# Patient Record
Sex: MALE | Race: WHITE | NOT HISPANIC OR LATINO
[De-identification: names, ages, dates, MRNs, and addresses within clinical notes are randomized per-mention and may not be internally consistent; named-entity substitution may affect disease eponyms.]

---

## 2019-03-15 ENCOUNTER — RX RENEWAL (OUTPATIENT)
Age: 69
End: 2019-03-15

## 2019-03-15 ENCOUNTER — OTHER (OUTPATIENT)
Age: 69
End: 2019-03-15

## 2019-03-18 ENCOUNTER — MEDICATION RENEWAL (OUTPATIENT)
Age: 69
End: 2019-03-18

## 2019-03-28 ENCOUNTER — RECORD ABSTRACTING (OUTPATIENT)
Age: 69
End: 2019-03-28

## 2019-03-28 DIAGNOSIS — Z80.42 FAMILY HISTORY OF MALIGNANT NEOPLASM OF PROSTATE: ICD-10-CM

## 2019-03-28 DIAGNOSIS — Z80.3 FAMILY HISTORY OF MALIGNANT NEOPLASM OF BREAST: ICD-10-CM

## 2019-03-28 DIAGNOSIS — Z87.09 PERSONAL HISTORY OF OTHER DISEASES OF THE RESPIRATORY SYSTEM: ICD-10-CM

## 2019-03-28 DIAGNOSIS — J06.9 ACUTE UPPER RESPIRATORY INFECTION, UNSPECIFIED: ICD-10-CM

## 2019-03-28 DIAGNOSIS — Z80.0 FAMILY HISTORY OF MALIGNANT NEOPLASM OF DIGESTIVE ORGANS: ICD-10-CM

## 2019-04-02 ENCOUNTER — APPOINTMENT (OUTPATIENT)
Dept: INTERNAL MEDICINE | Facility: CLINIC | Age: 69
End: 2019-04-02
Payer: COMMERCIAL

## 2019-04-02 VITALS — HEIGHT: 70 IN | BODY MASS INDEX: 27.2 KG/M2 | WEIGHT: 190 LBS

## 2019-04-02 VITALS — DIASTOLIC BLOOD PRESSURE: 76 MMHG | SYSTOLIC BLOOD PRESSURE: 120 MMHG

## 2019-04-02 DIAGNOSIS — R43.1 PAROSMIA: ICD-10-CM

## 2019-04-02 PROCEDURE — 99213 OFFICE O/P EST LOW 20 MIN: CPT

## 2019-04-02 RX ORDER — DOXYCYCLINE 100 MG/1
100 TABLET, FILM COATED ORAL
Refills: 0 | Status: COMPLETED | COMMUNITY
End: 2019-04-02

## 2019-04-02 RX ORDER — ATORVASTATIN CALCIUM 20 MG/1
20 TABLET, FILM COATED ORAL
Qty: 90 | Refills: 3 | Status: COMPLETED | COMMUNITY
End: 2019-04-02

## 2019-04-02 NOTE — PHYSICAL EXAM
[No Acute Distress] : no acute distress [Well Nourished] : well nourished [Well Developed] : well developed [Well-Appearing] : well-appearing [Normal Sclera/Conjunctiva] : normal sclera/conjunctiva [Normal Outer Ear/Nose] : the outer ears and nose were normal in appearance [Normal Oropharynx] : the oropharynx was normal [Normal TMs] : both tympanic membranes were normal [No JVD] : no jugular venous distention [Supple] : supple [No Lymphadenopathy] : no lymphadenopathy [Thyroid Normal, No Nodules] : the thyroid was normal and there were no nodules present [No Respiratory Distress] : no respiratory distress  [Clear to Auscultation] : lungs were clear to auscultation bilaterally [No Accessory Muscle Use] : no accessory muscle use [Normal Rate] : normal rate  [Regular Rhythm] : with a regular rhythm [Normal S1, S2] : normal S1 and S2 [No Joint Swelling] : no joint swelling [Grossly Normal Strength/Tone] : grossly normal strength/tone [No Rash] : no rash [Normal Gait] : normal gait [Coordination Grossly Intact] : coordination grossly intact [No Focal Deficits] : no focal deficits [Deep Tendon Reflexes (DTR)] : deep tendon reflexes were 2+ and symmetric [Normal Affect] : the affect was normal [Normal Insight/Judgement] : insight and judgment were intact

## 2019-04-02 NOTE — REVIEW OF SYSTEMS
[Dysuria] : no dysuria [Incontinence] : no incontinence [Hesitancy] : no hesitancy [Nocturia] : nocturia [Hematuria] : no hematuria [Frequency] : frequency [Impotence] : no impotency [Poor Libido] : libido not poor [Negative] : Heme/Lymph

## 2019-04-02 NOTE — HISTORY OF PRESENT ILLNESS
[FreeTextEntry1] : abnormal smell [de-identified] : over last month has periods of abnormal smell\par occurs frequently lasts a whole days when it comes\par not sick with runny nose\par not using statin\par had sinus infection 11 2018\par past bladder cancer, sees urologist, he wants pt to have scraping of prostate\par nocturia x 5

## 2019-08-12 ENCOUNTER — OTHER (OUTPATIENT)
Age: 69
End: 2019-08-12

## 2019-08-13 ENCOUNTER — APPOINTMENT (OUTPATIENT)
Dept: INTERNAL MEDICINE | Facility: CLINIC | Age: 69
End: 2019-08-13
Payer: COMMERCIAL

## 2019-08-13 PROCEDURE — 36415 COLL VENOUS BLD VENIPUNCTURE: CPT

## 2019-08-14 LAB
ALBUMIN SERPL ELPH-MCNC: 4.2 G/DL
ALP BLD-CCNC: 83 U/L
ALT SERPL-CCNC: 26 U/L
ANION GAP SERPL CALC-SCNC: 13 MMOL/L
AST SERPL-CCNC: 18 U/L
BASOPHILS # BLD AUTO: 0.04 K/UL
BASOPHILS NFR BLD AUTO: 0.8 %
BILIRUB SERPL-MCNC: 0.5 MG/DL
BUN SERPL-MCNC: 26 MG/DL
CALCIUM SERPL-MCNC: 9.1 MG/DL
CHLORIDE SERPL-SCNC: 105 MMOL/L
CO2 SERPL-SCNC: 25 MMOL/L
CREAT SERPL-MCNC: 1.37 MG/DL
EOSINOPHIL # BLD AUTO: 0.1 K/UL
EOSINOPHIL NFR BLD AUTO: 2 %
ERYTHROCYTE [SEDIMENTATION RATE] IN BLOOD BY WESTERGREN METHOD: 10 MM/HR
GLUCOSE SERPL-MCNC: 94 MG/DL
HCT VFR BLD CALC: 48.3 %
HGB BLD-MCNC: 15.8 G/DL
IMM GRANULOCYTES NFR BLD AUTO: 0.2 %
LYMPHOCYTES # BLD AUTO: 1.94 K/UL
LYMPHOCYTES NFR BLD AUTO: 39.2 %
MAN DIFF?: NORMAL
MCHC RBC-ENTMCNC: 29.9 PG
MCHC RBC-ENTMCNC: 32.7 GM/DL
MCV RBC AUTO: 91.3 FL
MONOCYTES # BLD AUTO: 0.4 K/UL
MONOCYTES NFR BLD AUTO: 8.1 %
NEUTROPHILS # BLD AUTO: 2.46 K/UL
NEUTROPHILS NFR BLD AUTO: 49.7 %
PLATELET # BLD AUTO: 224 K/UL
POTASSIUM SERPL-SCNC: 4.6 MMOL/L
PROT SERPL-MCNC: 6.5 G/DL
RBC # BLD: 5.29 M/UL
RBC # FLD: 12.4 %
SODIUM SERPL-SCNC: 143 MMOL/L
URATE SERPL-MCNC: 8.9 MG/DL
WBC # FLD AUTO: 4.95 K/UL

## 2019-09-05 ENCOUNTER — NON-APPOINTMENT (OUTPATIENT)
Age: 69
End: 2019-09-05

## 2019-09-05 ENCOUNTER — APPOINTMENT (OUTPATIENT)
Dept: INTERNAL MEDICINE | Facility: CLINIC | Age: 69
End: 2019-09-05
Payer: COMMERCIAL

## 2019-09-05 VITALS
SYSTOLIC BLOOD PRESSURE: 125 MMHG | BODY MASS INDEX: 27.2 KG/M2 | DIASTOLIC BLOOD PRESSURE: 76 MMHG | WEIGHT: 190 LBS | HEIGHT: 70 IN

## 2019-09-05 DIAGNOSIS — M79.89 OTHER SPECIFIED SOFT TISSUE DISORDERS: ICD-10-CM

## 2019-09-05 LAB
BILIRUB UR QL STRIP: NEGATIVE
CLARITY UR: CLEAR
COLLECTION METHOD: NORMAL
GLUCOSE UR-MCNC: NEGATIVE
HCG UR QL: NORMAL EU/DL
HGB UR QL STRIP.AUTO: NORMAL
KETONES UR-MCNC: NEGATIVE
LEUKOCYTE ESTERASE UR QL STRIP: NEGATIVE
NITRITE UR QL STRIP: NEGATIVE
PH UR STRIP: 5.5
PROT UR STRIP-MCNC: NORMAL
SP GR UR STRIP: 1.02

## 2019-09-05 PROCEDURE — G0442 ANNUAL ALCOHOL SCREEN 15 MIN: CPT

## 2019-09-05 PROCEDURE — 81003 URINALYSIS AUTO W/O SCOPE: CPT | Mod: QW

## 2019-09-05 PROCEDURE — 99397 PER PM REEVAL EST PAT 65+ YR: CPT | Mod: 25

## 2019-09-05 PROCEDURE — 93000 ELECTROCARDIOGRAM COMPLETE: CPT

## 2019-09-05 NOTE — PHYSICAL EXAM
[No Acute Distress] : no acute distress [Well Nourished] : well nourished [Well Developed] : well developed [Normal Sclera/Conjunctiva] : normal sclera/conjunctiva [Well-Appearing] : well-appearing [Normal Outer Ear/Nose] : the outer ears and nose were normal in appearance [Normal Oropharynx] : the oropharynx was normal [No JVD] : no jugular venous distention [No Lymphadenopathy] : no lymphadenopathy [Supple] : supple [Thyroid Normal, No Nodules] : the thyroid was normal and there were no nodules present [No Respiratory Distress] : no respiratory distress  [Clear to Auscultation] : lungs were clear to auscultation bilaterally [No Accessory Muscle Use] : no accessory muscle use [Normal Rate] : normal rate  [Regular Rhythm] : with a regular rhythm [Normal S1, S2] : normal S1 and S2 [No Murmur] : no murmur heard [No Carotid Bruits] : no carotid bruits [No Varicosities] : no varicosities [No Abdominal Bruit] : a ~M bruit was not heard ~T in the abdomen [Pedal Pulses Present] : the pedal pulses are present [No Edema] : there was no peripheral edema [No Extremity Clubbing/Cyanosis] : no extremity clubbing/cyanosis [No Palpable Aorta] : no palpable aorta [Normal Appearance] : normal in appearance [Soft] : abdomen soft [Non Tender] : non-tender [Non-distended] : non-distended [No Masses] : no abdominal mass palpated [Normal Bowel Sounds] : normal bowel sounds [No HSM] : no HSM [Normal Sphincter Tone] : normal sphincter tone [No Mass] : no mass [Normal Posterior Cervical Nodes] : no posterior cervical lymphadenopathy [Normal Anterior Cervical Nodes] : no anterior cervical lymphadenopathy [No CVA Tenderness] : no CVA  tenderness [No Spinal Tenderness] : no spinal tenderness [No Joint Swelling] : no joint swelling [Grossly Normal Strength/Tone] : grossly normal strength/tone [No Rash] : no rash [Coordination Grossly Intact] : coordination grossly intact [No Focal Deficits] : no focal deficits [Normal Gait] : normal gait [Deep Tendon Reflexes (DTR)] : deep tendon reflexes were 2+ and symmetric [Normal Affect] : the affect was normal [Normal Insight/Judgement] : insight and judgment were intact [Stool Occult Blood] : stool negative for occult blood

## 2019-09-05 NOTE — ASSESSMENT
[FreeTextEntry1] : Doing well on current regimen\par Right bundle branch block\par Closely followed by urologist the past bladder cancer and obstructive BPH\par We'll check uric acid with other bloods as patient will return fasting\par Has appointment scheduled next month with the urologist\par On time with gastroenterologist for screening colonoscopies

## 2019-09-05 NOTE — HEALTH RISK ASSESSMENT
[Yes] : Yes [Monthly or less (1 pt)] : Monthly or less (1 point) [Never (0 pts)] : Never (0 points) [1 or 2 (0 pts)] : 1 or 2 (0 points) [0] : 2) Feeling down, depressed, or hopeless: Not at all (0) [Patient reported colonoscopy was normal] : Patient reported colonoscopy was normal [] : No [Audit-CScore] : 1 [[id number]] : 0 [ColonoscopyDate] : 2017 [ColonoscopyComments] : elsewhere

## 2019-09-05 NOTE — HISTORY OF PRESENT ILLNESS
[FreeTextEntry1] : here for complete exam [de-identified] : Patient has hypertension well controlled on medications\par BPH with some obstruction followed closely by urologist may need scraping of the prostate in the near future\par Past bladder cancer gets yearly cystoscopy by urologist\par On time with colonoscopies with Dr. Tobar\par One episode of gout probably diet related with no recurrences since he has started following a gout diet\par Hyperlipidemia on statin in the past\par \par \par

## 2019-09-05 NOTE — REVIEW OF SYSTEMS
[Nocturia] : nocturia [Frequency] : frequency [Negative] : Heme/Lymph [Dysuria] : no dysuria [Incontinence] : no incontinence [Hesitancy] : no hesitancy [Hematuria] : no hematuria [Impotence] : no impotency [Poor Libido] : libido not poor

## 2019-09-06 LAB
APPEARANCE: CLEAR
BACTERIA: NEGATIVE
BILIRUBIN URINE: NEGATIVE
BLOOD URINE: ABNORMAL
COLOR: YELLOW
GLUCOSE QUALITATIVE U: NEGATIVE
HYALINE CASTS: 1 /LPF
KETONES URINE: NEGATIVE
LEUKOCYTE ESTERASE URINE: NEGATIVE
MICROSCOPIC-UA: NORMAL
NITRITE URINE: NEGATIVE
PH URINE: 6
PROTEIN URINE: ABNORMAL
RED BLOOD CELLS URINE: 3 /HPF
SPECIFIC GRAVITY URINE: 1.02
SQUAMOUS EPITHELIAL CELLS: 1 /HPF
UROBILINOGEN URINE: NORMAL
WHITE BLOOD CELLS URINE: 1 /HPF

## 2019-09-07 PROBLEM — N40.0 BENIGN PROSTATIC HYPERPLASIA, UNSPECIFIED WHETHER LOWER URINARY TRACT SYMPTOMS PRESENT: Status: ACTIVE | Noted: 2019-09-07

## 2019-09-09 ENCOUNTER — APPOINTMENT (OUTPATIENT)
Dept: INTERNAL MEDICINE | Facility: CLINIC | Age: 69
End: 2019-09-09
Payer: COMMERCIAL

## 2019-09-09 DIAGNOSIS — N40.0 BENIGN PROSTATIC HYPERPLASIA WITHOUT LOWER URINARY TRACT SYMPMS: ICD-10-CM

## 2019-09-09 PROCEDURE — 36415 COLL VENOUS BLD VENIPUNCTURE: CPT

## 2019-09-10 ENCOUNTER — RESULT REVIEW (OUTPATIENT)
Age: 69
End: 2019-09-10

## 2019-09-10 ENCOUNTER — RX RENEWAL (OUTPATIENT)
Age: 69
End: 2019-09-10

## 2019-09-10 LAB
25(OH)D3 SERPL-MCNC: 31.3 NG/ML
ALBUMIN SERPL ELPH-MCNC: 4.4 G/DL
ALP BLD-CCNC: 89 U/L
ALT SERPL-CCNC: 26 U/L
ANION GAP SERPL CALC-SCNC: 12 MMOL/L
AST SERPL-CCNC: 17 U/L
BASOPHILS # BLD AUTO: 0.04 K/UL
BASOPHILS NFR BLD AUTO: 0.7 %
BILIRUB SERPL-MCNC: 0.4 MG/DL
BUN SERPL-MCNC: 24 MG/DL
CALCIUM SERPL-MCNC: 9.3 MG/DL
CHLORIDE SERPL-SCNC: 104 MMOL/L
CHOLEST SERPL-MCNC: 232 MG/DL
CHOLEST/HDLC SERPL: 5.4 RATIO
CK SERPL-CCNC: 101 U/L
CO2 SERPL-SCNC: 25 MMOL/L
CREAT SERPL-MCNC: 1.39 MG/DL
EOSINOPHIL # BLD AUTO: 0.07 K/UL
EOSINOPHIL NFR BLD AUTO: 1.3 %
ESTIMATED AVERAGE GLUCOSE: 108 MG/DL
FERRITIN SERPL-MCNC: 133 NG/ML
GLUCOSE SERPL-MCNC: 100 MG/DL
HBA1C MFR BLD HPLC: 5.4 %
HCT VFR BLD CALC: 48.4 %
HDLC SERPL-MCNC: 43 MG/DL
HGB BLD-MCNC: 16 G/DL
IMM GRANULOCYTES NFR BLD AUTO: 0.4 %
IRON SATN MFR SERPL: 34 %
IRON SERPL-MCNC: 106 UG/DL
LDLC SERPL CALC-MCNC: 146 MG/DL
LYMPHOCYTES # BLD AUTO: 1.91 K/UL
LYMPHOCYTES NFR BLD AUTO: 35.4 %
MAN DIFF?: NORMAL
MCHC RBC-ENTMCNC: 30.2 PG
MCHC RBC-ENTMCNC: 33.1 GM/DL
MCV RBC AUTO: 91.3 FL
MONOCYTES # BLD AUTO: 0.42 K/UL
MONOCYTES NFR BLD AUTO: 7.8 %
NEUTROPHILS # BLD AUTO: 2.93 K/UL
NEUTROPHILS NFR BLD AUTO: 54.4 %
PLATELET # BLD AUTO: 215 K/UL
POTASSIUM SERPL-SCNC: 4.6 MMOL/L
PROT SERPL-MCNC: 6.8 G/DL
PSA SERPL-MCNC: 3.38 NG/ML
RBC # BLD: 5.3 M/UL
RBC # FLD: 12.5 %
SODIUM SERPL-SCNC: 141 MMOL/L
T4 FREE SERPL-MCNC: 1.2 NG/DL
TIBC SERPL-MCNC: 311 UG/DL
TRIGL SERPL-MCNC: 214 MG/DL
TSH SERPL-ACNC: 2.76 UIU/ML
UIBC SERPL-MCNC: 205 UG/DL
URATE SERPL-MCNC: 8.4 MG/DL
VIT B12 SERPL-MCNC: 397 PG/ML
WBC # FLD AUTO: 5.39 K/UL

## 2019-11-08 ENCOUNTER — OTHER (OUTPATIENT)
Age: 69
End: 2019-11-08

## 2019-11-12 ENCOUNTER — OTHER (OUTPATIENT)
Age: 69
End: 2019-11-12

## 2020-12-21 PROBLEM — J06.9 ACUTE URI: Status: RESOLVED | Noted: 2019-03-28 | Resolved: 2020-12-21

## 2020-12-28 ENCOUNTER — TRANSCRIPTION ENCOUNTER (OUTPATIENT)
Age: 70
End: 2020-12-28

## 2021-01-16 ENCOUNTER — TRANSCRIPTION ENCOUNTER (OUTPATIENT)
Age: 71
End: 2021-01-16

## 2021-01-19 ENCOUNTER — APPOINTMENT (OUTPATIENT)
Dept: DISASTER EMERGENCY | Facility: HOSPITAL | Age: 71
End: 2021-01-19

## 2021-01-19 ENCOUNTER — OUTPATIENT (OUTPATIENT)
Dept: INPATIENT UNIT | Facility: HOSPITAL | Age: 71
LOS: 1 days | End: 2021-01-19
Payer: COMMERCIAL

## 2021-01-19 VITALS
DIASTOLIC BLOOD PRESSURE: 88 MMHG | SYSTOLIC BLOOD PRESSURE: 138 MMHG | TEMPERATURE: 98 F | OXYGEN SATURATION: 98 % | HEART RATE: 74 BPM | RESPIRATION RATE: 18 BRPM

## 2021-01-19 VITALS
TEMPERATURE: 98 F | OXYGEN SATURATION: 97 % | HEART RATE: 65 BPM | SYSTOLIC BLOOD PRESSURE: 134 MMHG | RESPIRATION RATE: 18 BRPM | DIASTOLIC BLOOD PRESSURE: 86 MMHG

## 2021-01-19 DIAGNOSIS — U07.1 COVID-19: ICD-10-CM

## 2021-01-19 PROCEDURE — M0239: CPT

## 2021-01-19 RX ORDER — BAMLANIVIMAB 35 MG/ML
700 INJECTION, SOLUTION INTRAVENOUS ONCE
Refills: 0 | Status: COMPLETED | OUTPATIENT
Start: 2021-01-19 | End: 2021-01-19

## 2021-01-19 RX ADMIN — BAMLANIVIMAB 270 MILLIGRAM(S): 35 INJECTION, SOLUTION INTRAVENOUS at 09:10

## 2021-01-19 NOTE — CHART NOTE - NSCHARTNOTEFT_GEN_A_CORE
CC: Monoclonal Antibody Infusion/COVID 19 Positive  70yMale with PMHx  HTN, RBBB, HLD and symptoms of fever, cough, malaise    exam/findings:  T(C): 36.6 (01-19-21 @ 08:53), Max: 36.6 (01-19-21 @ 08:53)  HR: 74 (01-19-21 @ 08:53) (74 - 74)  BP: 138/88 (01-19-21 @ 08:53) (138/88 - 138/88)  RR: 18 (01-19-21 @ 08:53) (18 - 18)  SpO2: 98% (01-19-21 @ 08:53) (98% - 98%)      PE:   Appearance: NAD	  HEENT:   Normal oral mucosa,   Lymphatic: No lymphadenopathy  Cardiovascular: Normal S1 S2, No JVD, No murmurs, No edema  Respiratory: Lungs clear to auscultation	  Gastrointestinal:  Soft, Non-tender, + BS	  Skin: warm and dry  Neurologic: Non-focal  Extremities: Normal range of motion, no calf tenderness or edema    ASSESSMENT:  Pt is a 70y with PMHx of HTN, RBBB, HLD , Covid 19 Positive with symptoms in the last 10 days, who presents to infusion center for Monoclonal antibody infusion (Casirivimab/Imdevimab)  Symptoms/ Criteria: fever, cough, malaise  Risk Profile includes: Age >65, HTN, CV disease     PLAN:  - infusion procedure explained to patient   -Consent for monoclonal antibody infusion obtained   - Risk & benifits discussed/all questions answered  -infuse  Casirivimab/Imdevimab 1200mg/1200mg IV over one hour   -observe patient for one hour post infusion      I have reviewed the [facility] Emergency Use Authorization (EAU) and I have provided the patient or patient's caregiver with the following information:  1. FDA has authorized emergency use of Casirivimab/Imdevimab, which is not FDA-approved biologic product.  2. The patient or patient's caregiver has the option to accept or refuse administration of Casirivimab/Imdevimab  3. The significant known and benefits are unknown.  4. Information on available alternative treatments and risks and benefits of those alternatives.    Discharge:  Patient tolerated infusion well denies complaints of chest pain/SOB/dizziness/ palps  Vital signs stable  D/C instructions given/ fact sheet included.  Patient to follow-up with PCP as needed. CC: Monoclonal Antibody Infusion/COVID 19 Positive  70yMale with PMHx  HTN, RBBB, HLD and symptoms of fever, cough, malaise    exam/findings:  T(C): 36.6 (01-19-21 @ 08:53), Max: 36.6 (01-19-21 @ 08:53)  HR: 74 (01-19-21 @ 08:53) (74 - 74)  BP: 138/88 (01-19-21 @ 08:53) (138/88 - 138/88)  RR: 18 (01-19-21 @ 08:53) (18 - 18)  SpO2: 98% (01-19-21 @ 08:53) (98% - 98%)      PE:   Appearance: NAD	  HEENT:   Normal oral mucosa,   Lymphatic: No lymphadenopathy  Cardiovascular: Normal S1 S2, No JVD, No murmurs, No edema  Respiratory: Lungs clear to auscultation	  Gastrointestinal:  Soft, Non-tender, + BS	  Skin: warm and dry  Neurologic: Non-focal  Extremities: Normal range of motion, no calf tenderness or edema    ASSESSMENT:  Pt is a 70y with PMHx of HTN, RBBB, HLD , Covid 19 Positive with symptoms in the last 10 days, who presents to infusion center for Monoclonal antibody infusion (Bamlanivimab)  Symptoms/ Criteria: fever, cough, malaise  Risk Profile includes: Age >65, HTN, CV disease     PLAN:  - infusion procedure explained to patient   -Consent for monoclonal antibody infusion obtained   - Risk & benifits discussed/all questions answered  -infuse  Bamlanivimab 1200mg/1200mg IV over one hour   -observe patient for one hour post infusion      I have reviewed the Bamlanivimab Emergency Use Authorization (EAU) and I have provided the patient or patient's caregiver with the following information:  1. FDA has authorized emergency use of Bamlanivimab, which is not FDA-approved biologic product.  2. The patient or patient's caregiver has the option to accept or refuse administration of Bamlanivimab  3. The significant known and benefits are unknown.  4. Information on available alternative treatments and risks and benefits of those alternatives.    Discharge:  Patient tolerated infusion well denies complaints of chest pain/SOB/dizziness/ palps  Vital signs stable  D/C instructions given/ fact sheet included.  Patient to follow-up with PCP as needed. CC: Monoclonal Antibody Infusion/COVID 19 Positive  70yMale with PMHx  HTN, RBBB, HLD and symptoms of fever, cough, malaise    exam/findings:  T(C): 36.6 (01-19-21 @ 08:53), Max: 36.6 (01-19-21 @ 08:53)  HR: 74 (01-19-21 @ 08:53) (74 - 74)  BP: 138/88 (01-19-21 @ 08:53) (138/88 - 138/88)  RR: 18 (01-19-21 @ 08:53) (18 - 18)  SpO2: 98% (01-19-21 @ 08:53) (98% - 98%)      PE:   Appearance: NAD	  HEENT:   Normal oral mucosa,   Lymphatic: No lymphadenopathy  Cardiovascular: Normal S1 S2, No JVD, No murmurs, No edema  Respiratory: Lungs clear to auscultation	  Gastrointestinal:  Soft, Non-tender, + BS	  Skin: warm and dry  Neurologic: Non-focal  Extremities: Normal range of motion, no calf tenderness or edema    ASSESSMENT:  Pt is a 70y with PMHx of HTN, RBBB, HLD , Covid 19 Positive with symptoms in the last 10 days, who presents to infusion center for Monoclonal antibody infusion (Bamlanivimab)  Symptoms/ Criteria: fever, cough, malaise  Risk Profile includes: Age >65, HTN, CV disease     PLAN:  - infusion procedure explained to patient   -Consent for monoclonal antibody infusion obtained   - Risk & benifits discussed/all questions answered  -infuse  Bamlanivimab 700mg IV over one hour   -observe patient for one hour post infusion      I have reviewed the Bamlanivimab Emergency Use Authorization (EAU) and I have provided the patient or patient's caregiver with the following information:  1. FDA has authorized emergency use of Bamlanivimab, which is not FDA-approved biologic product.  2. The patient or patient's caregiver has the option to accept or refuse administration of Bamlanivimab  3. The significant known and benefits are unknown.  4. Information on available alternative treatments and risks and benefits of those alternatives.    Discharge:  Patient tolerated infusion well denies complaints of chest pain/SOB/dizziness/ palps  Vital signs stable  D/C instructions given/ fact sheet included.  Patient to follow-up with PCP as needed.

## 2021-01-20 ENCOUNTER — TRANSCRIPTION ENCOUNTER (OUTPATIENT)
Age: 71
End: 2021-01-20

## 2021-01-26 ENCOUNTER — TRANSCRIPTION ENCOUNTER (OUTPATIENT)
Age: 71
End: 2021-01-26

## 2021-10-15 ENCOUNTER — NON-APPOINTMENT (OUTPATIENT)
Age: 71
End: 2021-10-15

## 2021-10-26 ENCOUNTER — NON-APPOINTMENT (OUTPATIENT)
Age: 71
End: 2021-10-26

## 2021-10-26 ENCOUNTER — APPOINTMENT (OUTPATIENT)
Dept: INTERNAL MEDICINE | Facility: CLINIC | Age: 71
End: 2021-10-26
Payer: COMMERCIAL

## 2021-10-26 ENCOUNTER — RESULT CHARGE (OUTPATIENT)
Age: 71
End: 2021-10-26

## 2021-10-26 VITALS
TEMPERATURE: 97.6 F | HEIGHT: 70 IN | DIASTOLIC BLOOD PRESSURE: 72 MMHG | HEART RATE: 72 BPM | OXYGEN SATURATION: 96 % | SYSTOLIC BLOOD PRESSURE: 121 MMHG | BODY MASS INDEX: 26.77 KG/M2 | WEIGHT: 187 LBS

## 2021-10-26 DIAGNOSIS — R43.9 UNSPECIFIED DISTURBANCES OF SMELL AND TASTE: ICD-10-CM

## 2021-10-26 DIAGNOSIS — Z23 ENCOUNTER FOR IMMUNIZATION: ICD-10-CM

## 2021-10-26 LAB
25(OH)D3 SERPL-MCNC: 36.2 NG/ML
ALBUMIN SERPL ELPH-MCNC: 4.5 G/DL
ALP BLD-CCNC: 92 U/L
ALT SERPL-CCNC: 25 U/L
ANION GAP SERPL CALC-SCNC: 13 MMOL/L
AST SERPL-CCNC: 19 U/L
BASOPHILS # BLD AUTO: 0.04 K/UL
BASOPHILS NFR BLD AUTO: 0.7 %
BILIRUB SERPL-MCNC: 0.6 MG/DL
BUN SERPL-MCNC: 23 MG/DL
CALCIUM SERPL-MCNC: 9.3 MG/DL
CHLORIDE SERPL-SCNC: 101 MMOL/L
CHOLEST SERPL-MCNC: 213 MG/DL
CK SERPL-CCNC: 182 U/L
CO2 SERPL-SCNC: 24 MMOL/L
CREAT SERPL-MCNC: 1.31 MG/DL
EOSINOPHIL # BLD AUTO: 0.02 K/UL
EOSINOPHIL NFR BLD AUTO: 0.4 %
ESTIMATED AVERAGE GLUCOSE: 111 MG/DL
FERRITIN SERPL-MCNC: 170 NG/ML
GLUCOSE SERPL-MCNC: 109 MG/DL
HBA1C MFR BLD HPLC: 5.5 %
HCT VFR BLD CALC: 46 %
HDLC SERPL-MCNC: 42 MG/DL
HGB BLD-MCNC: 15.2 G/DL
IMM GRANULOCYTES NFR BLD AUTO: 0.2 %
IRON SATN MFR SERPL: 40 %
IRON SERPL-MCNC: 122 UG/DL
LDLC SERPL CALC-MCNC: 143 MG/DL
LYMPHOCYTES # BLD AUTO: 1.39 K/UL
LYMPHOCYTES NFR BLD AUTO: 24.9 %
MAN DIFF?: NORMAL
MCHC RBC-ENTMCNC: 29.6 PG
MCHC RBC-ENTMCNC: 33 GM/DL
MCV RBC AUTO: 89.5 FL
MONOCYTES # BLD AUTO: 0.42 K/UL
MONOCYTES NFR BLD AUTO: 7.5 %
NEUTROPHILS # BLD AUTO: 3.71 K/UL
NEUTROPHILS NFR BLD AUTO: 66.3 %
NONHDLC SERPL-MCNC: 171 MG/DL
PLATELET # BLD AUTO: 234 K/UL
POTASSIUM SERPL-SCNC: 4.5 MMOL/L
PROT SERPL-MCNC: 6.7 G/DL
PSA SERPL-MCNC: 4.25 NG/ML
RBC # BLD: 5.14 M/UL
RBC # FLD: 13 %
SODIUM SERPL-SCNC: 138 MMOL/L
T4 FREE SERPL-MCNC: 1.3 NG/DL
TIBC SERPL-MCNC: 306 UG/DL
TRIGL SERPL-MCNC: 142 MG/DL
TSH SERPL-ACNC: 2.48 UIU/ML
UIBC SERPL-MCNC: 184 UG/DL
URATE SERPL-MCNC: 8.4 MG/DL
VIT B12 SERPL-MCNC: 468 PG/ML
WBC # FLD AUTO: 5.59 K/UL

## 2021-10-26 PROCEDURE — 99397 PER PM REEVAL EST PAT 65+ YR: CPT | Mod: 25

## 2021-10-26 PROCEDURE — 81003 URINALYSIS AUTO W/O SCOPE: CPT | Mod: QW

## 2021-10-26 PROCEDURE — G0444 DEPRESSION SCREEN ANNUAL: CPT | Mod: NC,59

## 2021-10-26 PROCEDURE — 82270 OCCULT BLOOD FECES: CPT

## 2021-10-26 PROCEDURE — 93000 ELECTROCARDIOGRAM COMPLETE: CPT | Mod: 59

## 2021-10-26 PROCEDURE — 90662 IIV NO PRSV INCREASED AG IM: CPT

## 2021-10-26 PROCEDURE — G0008: CPT

## 2021-10-26 RX ORDER — ATORVASTATIN CALCIUM 20 MG/1
20 TABLET, FILM COATED ORAL DAILY
Qty: 90 | Refills: 3 | Status: COMPLETED | COMMUNITY
Start: 2019-09-05 | End: 2021-10-26

## 2021-10-26 NOTE — REVIEW OF SYSTEMS
[Dysuria] : no dysuria [Incontinence] : no incontinence [Hesitancy] : no hesitancy [Nocturia] : nocturia [Hematuria] : no hematuria [Frequency] : frequency [Impotence] : no impotency [Poor Libido] : libido not poor [Negative] : Heme/Lymph [FreeTextEntry4] : smell and taste disorder

## 2021-10-26 NOTE — HEALTH RISK ASSESSMENT
[Yes] : Yes [Monthly or less (1 pt)] : Monthly or less (1 point) [1 or 2 (0 pts)] : 1 or 2 (0 points) [Never (0 pts)] : Never (0 points) [0] : 2) Feeling down, depressed, or hopeless: Not at all (0) [Patient reported colonoscopy was normal] : Patient reported colonoscopy was normal [PHQ-2 Negative - No further assessment needed] : PHQ-2 Negative - No further assessment needed [] : No [Audit-CScore] : 1 [[id number]] : 0 [ColonoscopyDate] : 2017 [ColonoscopyComments] : elsewhere

## 2021-10-26 NOTE — HISTORY OF PRESENT ILLNESS
[FreeTextEntry1] : here for complete exam [de-identified] :  hypertension well controlled on medications\par BPH with some obstruction followed closely by urologist once yearly, PSA higher now\par Past bladder cancer gets yearly cystoscopy by urologist\par On time with colonoscopies with Dr. Tobar\par One episode of gout in past probably diet related with no recurrences since he has started following a gout diet\par Hyperlipidemia on statin in the past not using statin\par \par \par

## 2021-10-26 NOTE — PLAN
[FreeTextEntry1] : urology follow up for elevation in PSA\par declines statin but may be willing to try low dose rosuvastatin, ordered

## 2021-10-26 NOTE — ASSESSMENT
[FreeTextEntry1] : Doing well \par HTN well controlled\par Right bundle branch block\par followed by urologist the past bladder cancer and obstructive BPH\par On time with gastroenterologist for screening colonoscopies\par rise in PSA from 2019\par LDL elevation

## 2021-10-27 LAB
APPEARANCE: CLEAR
BACTERIA: NEGATIVE
BILIRUBIN URINE: NEGATIVE
BLOOD URINE: ABNORMAL
COLOR: NORMAL
GLUCOSE QUALITATIVE U: NEGATIVE
HYALINE CASTS: 1 /LPF
KETONES URINE: NEGATIVE
LEUKOCYTE ESTERASE URINE: NEGATIVE
MICROSCOPIC-UA: NORMAL
NITRITE URINE: NEGATIVE
PH URINE: 5.5
PROTEIN URINE: NORMAL
RED BLOOD CELLS URINE: 1 /HPF
SPECIFIC GRAVITY URINE: 1.02
SQUAMOUS EPITHELIAL CELLS: 0 /HPF
UROBILINOGEN URINE: NORMAL
WHITE BLOOD CELLS URINE: 2 /HPF

## 2022-02-19 ENCOUNTER — TRANSCRIPTION ENCOUNTER (OUTPATIENT)
Age: 72
End: 2022-02-19

## 2022-03-14 ENCOUNTER — TRANSCRIPTION ENCOUNTER (OUTPATIENT)
Age: 72
End: 2022-03-14

## 2022-03-30 ENCOUNTER — APPOINTMENT (OUTPATIENT)
Dept: OPHTHALMOLOGY | Facility: CLINIC | Age: 72
End: 2022-03-30

## 2022-05-04 ENCOUNTER — APPOINTMENT (OUTPATIENT)
Dept: OPHTHALMOLOGY | Facility: CLINIC | Age: 72
End: 2022-05-04
Payer: COMMERCIAL

## 2022-05-04 ENCOUNTER — NON-APPOINTMENT (OUTPATIENT)
Age: 72
End: 2022-05-04

## 2022-05-04 PROCEDURE — 92020 GONIOSCOPY: CPT

## 2022-05-04 PROCEDURE — 92004 COMPRE OPH EXAM NEW PT 1/>: CPT

## 2022-05-13 ENCOUNTER — APPOINTMENT (OUTPATIENT)
Dept: OPHTHALMOLOGY | Facility: CLINIC | Age: 72
End: 2022-05-13

## 2022-06-21 ENCOUNTER — APPOINTMENT (OUTPATIENT)
Dept: COLORECTAL SURGERY | Facility: CLINIC | Age: 72
End: 2022-06-21
Payer: COMMERCIAL

## 2022-06-21 VITALS
RESPIRATION RATE: 14 BRPM | TEMPERATURE: 98 F | HEIGHT: 70 IN | DIASTOLIC BLOOD PRESSURE: 86 MMHG | HEART RATE: 98 BPM | SYSTOLIC BLOOD PRESSURE: 127 MMHG | WEIGHT: 190 LBS | BODY MASS INDEX: 27.2 KG/M2 | OXYGEN SATURATION: 97 %

## 2022-06-21 DIAGNOSIS — Z78.9 OTHER SPECIFIED HEALTH STATUS: ICD-10-CM

## 2022-06-21 DIAGNOSIS — K64.9 UNSPECIFIED HEMORRHOIDS: ICD-10-CM

## 2022-06-21 DIAGNOSIS — L29.0 PRURITUS ANI: ICD-10-CM

## 2022-06-21 PROCEDURE — 46600 DIAGNOSTIC ANOSCOPY SPX: CPT

## 2022-06-21 PROCEDURE — 99243 OFF/OP CNSLTJ NEW/EST LOW 30: CPT | Mod: 25

## 2022-06-21 RX ORDER — INDOMETHACIN 50 MG/1
50 CAPSULE ORAL
Qty: 21 | Refills: 0 | Status: DISCONTINUED | COMMUNITY
Start: 2022-02-19 | End: 2022-06-21

## 2022-06-21 RX ORDER — CETIRIZINE HYDROCHLORIDE 10 MG/1
10 TABLET, COATED ORAL
Qty: 14 | Refills: 0 | Status: DISCONTINUED | COMMUNITY
Start: 2022-03-14 | End: 2022-06-21

## 2022-06-21 RX ORDER — DICLOFENAC SODIUM 75 MG/1
75 TABLET, DELAYED RELEASE ORAL
Qty: 60 | Refills: 0 | Status: DISCONTINUED | COMMUNITY
Start: 2022-06-06 | End: 2022-06-21

## 2022-06-21 RX ORDER — ROSUVASTATIN CALCIUM 5 MG/1
5 TABLET, FILM COATED ORAL DAILY
Qty: 90 | Refills: 3 | Status: DISCONTINUED | COMMUNITY
Start: 2021-10-26 | End: 2022-06-21

## 2022-06-21 RX ORDER — PREDNISONE 20 MG/1
20 TABLET ORAL
Qty: 10 | Refills: 0 | Status: DISCONTINUED | COMMUNITY
Start: 2022-03-14 | End: 2022-06-21

## 2022-06-21 RX ORDER — HYDROCORTISONE 25 MG/G
2.5 CREAM TOPICAL
Qty: 1 | Refills: 5 | Status: ACTIVE | COMMUNITY
Start: 2022-06-21 | End: 1900-01-01

## 2022-06-21 RX ORDER — FLUTICASONE PROPIONATE 50 UG/1
50 SPRAY, METERED NASAL
Qty: 16 | Refills: 0 | Status: DISCONTINUED | COMMUNITY
Start: 2022-03-14 | End: 2022-06-21

## 2022-06-21 NOTE — ASSESSMENT
[FreeTextEntry1] : Anal itching likely secondary to change in bowel habits\par -Recommended patient initiated fiber supplementation to help bulk stool\par -High fiber diet\par -Patient should discontinue use of moist towelettes\par -Hydrocortisone cream for intense itching\par -Dry gauze and perianal area to keep area dry\par -Patient will followup in office if persistent itching

## 2022-06-21 NOTE — PHYSICAL EXAM
[Normal Breath Sounds] : Normal breath sounds [Normal Heart Sounds] : normal heart sounds [Normal Rate and Rhythm] : normal rate and rhythm [Alert] : alert [Oriented to Person] : oriented to person [Oriented to Place] : oriented to place [Oriented to Time] : oriented to time [Calm] : calm [de-identified] : flat soft +BS NT/ND [de-identified] : well nourished male [de-identified] : NC/AT [de-identified] : +ROM [de-identified] : intact

## 2022-06-21 NOTE — HISTORY OF PRESENT ILLNESS
[FreeTextEntry1] : 72yo M pt presents with anal itching for 1 month.\par Pt has BM daily, soft formed stools.\par Denies pain, bleeding, change in diet or products, BM difficulty.Patient does report softer stools over the last month and a half.\par Drinks 1 cup of coffee a day and some sweets, but says he has always done this. Not more now.We also reviewed dietary trigger such as tomato-based products and dairy\par \par Last colonoscopy ws 2019 or 2020, nl study.Note Silvio pain no fevers or chills no nausea or vomiting. Patient does report needing to wipe multiple times after bowel movements

## 2022-06-21 NOTE — CONSULT LETTER
[Dear  ___] : Dear  [unfilled], [Consult Letter:] : I had the pleasure of evaluating your patient, [unfilled]. [Please see my note below.] : Please see my note below. [Consult Closing:] : Thank you very much for allowing me to participate in the care of this patient.  If you have any questions, please do not hesitate to contact me. [Sincerely,] : Sincerely, [FreeTextEntry2] : Zane Emmanuel [FreeTextEntry3] : Leon Baker MD FACS\par Chief Colon and Rectal Surgery\par [facility]

## 2022-06-22 ENCOUNTER — APPOINTMENT (OUTPATIENT)
Dept: OPHTHALMOLOGY | Facility: CLINIC | Age: 72
End: 2022-06-22

## 2022-06-22 ENCOUNTER — NON-APPOINTMENT (OUTPATIENT)
Age: 72
End: 2022-06-22

## 2022-09-07 ENCOUNTER — APPOINTMENT (OUTPATIENT)
Dept: UROLOGY | Facility: CLINIC | Age: 72
End: 2022-09-07

## 2022-09-07 VITALS
HEART RATE: 96 BPM | BODY MASS INDEX: 27.92 KG/M2 | SYSTOLIC BLOOD PRESSURE: 134 MMHG | HEIGHT: 70 IN | DIASTOLIC BLOOD PRESSURE: 97 MMHG | WEIGHT: 195 LBS | TEMPERATURE: 97.5 F | RESPIRATION RATE: 16 BRPM

## 2022-09-07 DIAGNOSIS — Z01.818 ENCOUNTER FOR OTHER PREPROCEDURAL EXAMINATION: ICD-10-CM

## 2022-09-07 LAB
BILIRUB UR QL STRIP: NEGATIVE
CLARITY UR: CLEAR
COLLECTION METHOD: NORMAL
GLUCOSE UR-MCNC: NEGATIVE
HCG UR QL: 0.2 EU/DL
HGB UR QL STRIP.AUTO: NORMAL
KETONES UR-MCNC: NEGATIVE
LEUKOCYTE ESTERASE UR QL STRIP: NEGATIVE
NITRITE UR QL STRIP: NEGATIVE
PH UR STRIP: 5.5
PROT UR STRIP-MCNC: NORMAL
SP GR UR STRIP: 1.02

## 2022-09-07 PROCEDURE — 81003 URINALYSIS AUTO W/O SCOPE: CPT | Mod: QW

## 2022-09-07 PROCEDURE — 99205 OFFICE O/P NEW HI 60 MIN: CPT | Mod: 25

## 2022-09-07 PROCEDURE — 51798 US URINE CAPACITY MEASURE: CPT

## 2022-09-07 RX ORDER — TAMSULOSIN HYDROCHLORIDE 0.4 MG/1
0.4 CAPSULE ORAL
Qty: 90 | Refills: 3 | Status: DISCONTINUED | COMMUNITY
Start: 2022-09-07 | End: 2022-09-07

## 2022-09-07 RX ORDER — FINASTERIDE 5 MG/1
5 TABLET, FILM COATED ORAL DAILY
Qty: 90 | Refills: 3 | Status: ACTIVE | COMMUNITY
Start: 2022-09-07 | End: 1900-01-01

## 2022-09-07 RX ORDER — DIAZEPAM 5 MG/1
5 TABLET ORAL
Qty: 2 | Refills: 0 | Status: ACTIVE | COMMUNITY
Start: 2022-09-07 | End: 1900-01-01

## 2022-09-07 RX ORDER — FINASTERIDE 5 MG/1
5 TABLET, FILM COATED ORAL DAILY
Qty: 90 | Refills: 3 | Status: DISCONTINUED | COMMUNITY
Start: 2022-09-07 | End: 2022-09-07

## 2022-09-07 NOTE — PHYSICAL EXAM
[General Appearance - Well Developed] : well developed [General Appearance - Well Nourished] : well nourished [Normal Appearance] : normal appearance [Well Groomed] : well groomed [General Appearance - In No Acute Distress] : no acute distress [FreeTextEntry1] :  He has normal male external genitalia. Normal meatus. Bilateral testes are descended intrascrotally and normal to palpation. No Hernias appreciated. On rectal examination, there is normal sphincter tone. The prostate is 40 gram in size. It is clinically benign without focal induration or nodularity.

## 2022-09-07 NOTE — HISTORY OF PRESENT ILLNESS
[FreeTextEntry1] : Reason for Visit: \par \par This is a 72 year-year-old gentleman with difficulty urinating. Patient is referred for evaluation of his condition. by Dr Ortiz. He reports 10 years ago He had a tumor in the bladder. Tumor was operated by DR foss. He is now in remission. He was followed by Dr Gordo Simmons at first every 3 months with CT scans, Ultrasounds. Now He is followed every year. He c/o of urinary hesitancy and pain. Nocturia x3-4. The stream quality is poor. The last few days he reports he has been better. Pt is unsure if that is due to his recent diet. He denies he is on any prostate medication. He is on HTN medicine. Pt takes medication for nasal congestion sometimes. He reports 3 month ago he had a congestion which he was given medication for. He reports his last PSA is 4.25. No Fhx of prostate cancer.  He reports Fhx of bladder cancer on his father side. Pt has never smoke. 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 \par \par Patient denies any gross hematuria or dysuria or urinary incontinence. The patient denies any aggravating or relieving factors. The patient denies any interference of function. The patient is entirely asymptomatic. All other review of systems are negative. Past medical history, family history and social history were inquired and were noncontributory to current condition. Patient [denies tobacco use]. Medications and allergies were reviewed.\par \par On examination, the patient is a healthy-appearing gentleman in no acute distress. He is alert and oriented follows commands. He has normal mood and affect. He is normocephalic. Oral no thrush.  He has normal male external genitalia. Normal meatus. Bilateral testes are descended intrascrotally and normal to palpation. No Hernias appreciated. On rectal examination, there is normal sphincter tone. The prostate is 40 gram in size. It is clinically benign without focal induration or nodularity.\par \par Assessment: bladder cancer, LUTS, elevated PSA, \par \par PLAN: PSA today, Finasteride and flowmax, prostate MRI, needs Valium for MRI\par \par I counseled the patient. I discussed the various etiologies of his symptoms. []. Risks and alternatives were discussed. I answered the patient questions. The patient will follow-up as directed and will contact me with any questions or concerns. Thank you for the opportunity to participate in the care of this patient. I'll keep you updated on his progress.\par ____\par

## 2022-09-08 LAB — PSA SERPL-MCNC: 4.78 NG/ML

## 2022-09-20 ENCOUNTER — APPOINTMENT (OUTPATIENT)
Dept: MRI IMAGING | Facility: IMAGING CENTER | Age: 72
End: 2022-09-20

## 2022-09-20 ENCOUNTER — OUTPATIENT (OUTPATIENT)
Dept: OUTPATIENT SERVICES | Facility: HOSPITAL | Age: 72
LOS: 1 days | End: 2022-09-20
Payer: COMMERCIAL

## 2022-09-20 DIAGNOSIS — N40.0 BENIGN PROSTATIC HYPERPLASIA WITHOUT LOWER URINARY TRACT SYMPTOMS: ICD-10-CM

## 2022-09-20 DIAGNOSIS — R97.20 ELEVATED PROSTATE SPECIFIC ANTIGEN [PSA]: ICD-10-CM

## 2022-09-21 ENCOUNTER — RESULT REVIEW (OUTPATIENT)
Age: 72
End: 2022-09-21

## 2022-09-21 PROCEDURE — 76498 UNLISTED MR PROCEDURE: CPT

## 2022-09-21 PROCEDURE — A9585: CPT

## 2022-09-21 PROCEDURE — 72197 MRI PELVIS W/O & W/DYE: CPT | Mod: 26

## 2022-09-21 PROCEDURE — 72197 MRI PELVIS W/O & W/DYE: CPT

## 2022-09-21 PROCEDURE — 76498P: CUSTOM | Mod: 26

## 2023-02-24 NOTE — PHYSICAL EXAM
All other review of systems negative, except as noted in HPI [Well Nourished] : well nourished [Well Developed] : well developed [Well-Appearing] : well-appearing [Normal Sclera/Conjunctiva] : normal sclera/conjunctiva [Normal Outer Ear/Nose] : the outer ears and nose were normal in appearance [Normal Oropharynx] : the oropharynx was normal [No JVD] : no jugular venous distention [No Lymphadenopathy] : no lymphadenopathy [Supple] : supple [Thyroid Normal, No Nodules] : the thyroid was normal and there were no nodules present [No Respiratory Distress] : no respiratory distress  [No Accessory Muscle Use] : no accessory muscle use [Clear to Auscultation] : lungs were clear to auscultation bilaterally [Normal Rate] : normal rate  [Regular Rhythm] : with a regular rhythm [Normal S1, S2] : normal S1 and S2 [No Murmur] : no murmur heard [No Carotid Bruits] : no carotid bruits [No Abdominal Bruit] : a ~M bruit was not heard ~T in the abdomen [No Varicosities] : no varicosities [Pedal Pulses Present] : the pedal pulses are present [No Edema] : there was no peripheral edema [No Palpable Aorta] : no palpable aorta [No Extremity Clubbing/Cyanosis] : no extremity clubbing/cyanosis [Normal Appearance] : normal in appearance [Soft] : abdomen soft [Non Tender] : non-tender [Non-distended] : non-distended [No Masses] : no abdominal mass palpated [No HSM] : no HSM [Normal Bowel Sounds] : normal bowel sounds [Normal Sphincter Tone] : normal sphincter tone [No Mass] : no mass [Testes Mass (___cm)] : there were no testicular masses [No Prostate Nodules] : no prostate nodules [Normal Posterior Cervical Nodes] : no posterior cervical lymphadenopathy [Normal Anterior Cervical Nodes] : no anterior cervical lymphadenopathy [No CVA Tenderness] : no CVA  tenderness [No Spinal Tenderness] : no spinal tenderness [No Joint Swelling] : no joint swelling [Grossly Normal Strength/Tone] : grossly normal strength/tone [No Rash] : no rash [Coordination Grossly Intact] : coordination grossly intact [No Focal Deficits] : no focal deficits [Normal Gait] : normal gait [Deep Tendon Reflexes (DTR)] : deep tendon reflexes were 2+ and symmetric [Normal Affect] : the affect was normal [Normal Insight/Judgement] : insight and judgment were intact [Stool Occult Blood] : stool negative for occult blood [FreeTextEntry1] : enlarged prostate

## 2023-03-03 ENCOUNTER — NON-APPOINTMENT (OUTPATIENT)
Age: 73
End: 2023-03-03

## 2023-03-03 ENCOUNTER — APPOINTMENT (OUTPATIENT)
Dept: INTERNAL MEDICINE | Facility: CLINIC | Age: 73
End: 2023-03-03
Payer: COMMERCIAL

## 2023-03-03 VITALS — HEART RATE: 76 BPM | OXYGEN SATURATION: 98 % | WEIGHT: 191 LBS | HEIGHT: 70 IN | BODY MASS INDEX: 27.35 KG/M2

## 2023-03-03 VITALS — DIASTOLIC BLOOD PRESSURE: 74 MMHG | SYSTOLIC BLOOD PRESSURE: 114 MMHG

## 2023-03-03 DIAGNOSIS — R97.20 ELEVATED PROSTATE, SPECIFIC ANTIGEN [PSA]: ICD-10-CM

## 2023-03-03 DIAGNOSIS — D50.9 IRON DEFICIENCY ANEMIA, UNSPECIFIED: ICD-10-CM

## 2023-03-03 DIAGNOSIS — R33.9 RETENTION OF URINE, UNSPECIFIED: ICD-10-CM

## 2023-03-03 PROCEDURE — 36415 COLL VENOUS BLD VENIPUNCTURE: CPT

## 2023-03-03 PROCEDURE — G0439: CPT

## 2023-03-03 PROCEDURE — 93000 ELECTROCARDIOGRAM COMPLETE: CPT | Mod: 59

## 2023-03-03 PROCEDURE — G0444 DEPRESSION SCREEN ANNUAL: CPT

## 2023-03-03 RX ORDER — TAMSULOSIN HYDROCHLORIDE 0.4 MG/1
0.4 CAPSULE ORAL
Qty: 90 | Refills: 3 | Status: COMPLETED | COMMUNITY
Start: 2022-09-07 | End: 2023-03-03

## 2023-03-03 NOTE — ASSESSMENT
[FreeTextEntry1] : Doing well \par HTN well controlled\par Right bundle branch block\par followed by urologist past bladder cancer and obstructive BPH and low grade prostate cancer\par On time with gastroenterologist for screening colonoscopies\par

## 2023-03-03 NOTE — REVIEW OF SYSTEMS
[Nocturia] : nocturia [Frequency] : frequency [Negative] : Heme/Lymph [Dysuria] : no dysuria [Incontinence] : no incontinence [Hesitancy] : no hesitancy [Hematuria] : no hematuria [Impotence] : no impotency [Poor Libido] : libido not poor [FreeTextEntry4] : smell and taste disorder

## 2023-03-03 NOTE — HISTORY OF PRESENT ILLNESS
[FreeTextEntry1] : here for complete exam [de-identified] :  hypertension well controlled on medications\par BPH with some obstruction followed closely by urologist \par Past bladder cancer gets yearly cystoscopy by urologist\par had prostate biopsy, has low grade prostate cancer for active surveillance\par On time with colonoscopies with Dr. Tobar\par One episode of gout in past probably diet related with no recurrences since he has started following a gout diet\par Hyperlipidemia on statin in the past not using statin\par ejaculation issues from tamsulosin\par \par \par

## 2023-03-03 NOTE — HEALTH RISK ASSESSMENT
[1 or 2 (0 pts)] : 1 or 2 (0 points) [No falls in past year] : Patient reported no falls in the past year [0] : 2) Feeling down, depressed, or hopeless: Not at all (0) [PHQ-2 Negative - No further assessment needed] : PHQ-2 Negative - No further assessment needed [Patient reported colonoscopy was normal] : Patient reported colonoscopy was normal [No] : No [Never (0 pts)] : Never (0 points) [Never] : Never [Audit-CScore] : 1 [[id number]] : 0 [ColonoscopyDate] : 2021 [ColonoscopyComments] : elsewhere

## 2023-03-03 NOTE — PHYSICAL EXAM
[Well Nourished] : well nourished [Well Developed] : well developed [Well-Appearing] : well-appearing [Normal Sclera/Conjunctiva] : normal sclera/conjunctiva [Normal Outer Ear/Nose] : the outer ears and nose were normal in appearance [Normal Oropharynx] : the oropharynx was normal [No JVD] : no jugular venous distention [No Lymphadenopathy] : no lymphadenopathy [Supple] : supple [Thyroid Normal, No Nodules] : the thyroid was normal and there were no nodules present [No Respiratory Distress] : no respiratory distress  [No Accessory Muscle Use] : no accessory muscle use [Clear to Auscultation] : lungs were clear to auscultation bilaterally [Normal Rate] : normal rate  [Regular Rhythm] : with a regular rhythm [Normal S1, S2] : normal S1 and S2 [No Murmur] : no murmur heard [No Carotid Bruits] : no carotid bruits [No Abdominal Bruit] : a ~M bruit was not heard ~T in the abdomen [No Varicosities] : no varicosities [Pedal Pulses Present] : the pedal pulses are present [No Edema] : there was no peripheral edema [No Palpable Aorta] : no palpable aorta [No Extremity Clubbing/Cyanosis] : no extremity clubbing/cyanosis [Normal Appearance] : normal in appearance [Soft] : abdomen soft [Non Tender] : non-tender [Non-distended] : non-distended [No Masses] : no abdominal mass palpated [No HSM] : no HSM [Normal Bowel Sounds] : normal bowel sounds [Testes Mass (___cm)] : there were no testicular masses [No CVA Tenderness] : no CVA  tenderness [No Spinal Tenderness] : no spinal tenderness [No Joint Swelling] : no joint swelling [Grossly Normal Strength/Tone] : grossly normal strength/tone [No Rash] : no rash [Coordination Grossly Intact] : coordination grossly intact [No Focal Deficits] : no focal deficits [Normal Gait] : normal gait [Deep Tendon Reflexes (DTR)] : deep tendon reflexes were 2+ and symmetric [Normal Affect] : the affect was normal [Normal Insight/Judgement] : insight and judgment were intact

## 2023-03-03 NOTE — PLAN
[FreeTextEntry1] : urology follow \par try switching tamsulosin to daily tadalafil of ok with urology due to orgasm issues

## 2023-03-06 LAB
25(OH)D3 SERPL-MCNC: 36 NG/ML
ALBUMIN SERPL ELPH-MCNC: 4.6 G/DL
ALP BLD-CCNC: 99 U/L
ALT SERPL-CCNC: 28 U/L
ANION GAP SERPL CALC-SCNC: 14 MMOL/L
APPEARANCE: CLEAR
AST SERPL-CCNC: 23 U/L
BACTERIA: NEGATIVE
BASOPHILS # BLD AUTO: 0.03 K/UL
BASOPHILS NFR BLD AUTO: 0.6 %
BILIRUB SERPL-MCNC: 0.6 MG/DL
BILIRUBIN URINE: NEGATIVE
BLOOD URINE: NEGATIVE
BUN SERPL-MCNC: 19 MG/DL
CALCIUM SERPL-MCNC: 9.6 MG/DL
CHLORIDE SERPL-SCNC: 103 MMOL/L
CHOLEST SERPL-MCNC: 231 MG/DL
CK SERPL-CCNC: 107 U/L
CO2 SERPL-SCNC: 25 MMOL/L
COLOR: NORMAL
CREAT SERPL-MCNC: 1.39 MG/DL
EGFR: 55 ML/MIN/1.73M2
EOSINOPHIL # BLD AUTO: 0.03 K/UL
EOSINOPHIL NFR BLD AUTO: 0.6 %
ESTIMATED AVERAGE GLUCOSE: 111 MG/DL
FERRITIN SERPL-MCNC: 187 NG/ML
GLUCOSE QUALITATIVE U: NEGATIVE
GLUCOSE SERPL-MCNC: 95 MG/DL
HBA1C MFR BLD HPLC: 5.5 %
HCT VFR BLD CALC: 48.8 %
HDLC SERPL-MCNC: 44 MG/DL
HGB BLD-MCNC: 15.7 G/DL
HYALINE CASTS: 0 /LPF
IMM GRANULOCYTES NFR BLD AUTO: 0.2 %
IRON SATN MFR SERPL: 31 %
IRON SERPL-MCNC: 101 UG/DL
KETONES URINE: NEGATIVE
LDLC SERPL CALC-MCNC: 161 MG/DL
LEUKOCYTE ESTERASE URINE: NEGATIVE
LYMPHOCYTES # BLD AUTO: 1.58 K/UL
LYMPHOCYTES NFR BLD AUTO: 30.4 %
MAN DIFF?: NORMAL
MCHC RBC-ENTMCNC: 29.7 PG
MCHC RBC-ENTMCNC: 32.2 GM/DL
MCV RBC AUTO: 92.2 FL
MICROSCOPIC-UA: NORMAL
MONOCYTES # BLD AUTO: 0.38 K/UL
MONOCYTES NFR BLD AUTO: 7.3 %
NEUTROPHILS # BLD AUTO: 3.16 K/UL
NEUTROPHILS NFR BLD AUTO: 60.9 %
NITRITE URINE: NEGATIVE
NONHDLC SERPL-MCNC: 186 MG/DL
PH URINE: 6
PLATELET # BLD AUTO: 202 K/UL
POTASSIUM SERPL-SCNC: 4.8 MMOL/L
PROT SERPL-MCNC: 6.9 G/DL
PROTEIN URINE: NORMAL
PSA SERPL-MCNC: 4.93 NG/ML
RBC # BLD: 5.29 M/UL
RBC # FLD: 13.2 %
RED BLOOD CELLS URINE: 3 /HPF
SODIUM SERPL-SCNC: 142 MMOL/L
SPECIFIC GRAVITY URINE: 1.02
SQUAMOUS EPITHELIAL CELLS: 1 /HPF
T4 FREE SERPL-MCNC: 1.3 NG/DL
TIBC SERPL-MCNC: 326 UG/DL
TRIGL SERPL-MCNC: 125 MG/DL
TSH SERPL-ACNC: 2.52 UIU/ML
UIBC SERPL-MCNC: 225 UG/DL
URATE SERPL-MCNC: 8.5 MG/DL
UROBILINOGEN URINE: NORMAL
VIT B12 SERPL-MCNC: 418 PG/ML
WBC # FLD AUTO: 5.19 K/UL
WHITE BLOOD CELLS URINE: 3 /HPF

## 2023-03-06 RX ORDER — COLCHICINE 0.6 MG/1
0.6 CAPSULE ORAL
Qty: 20 | Refills: 0 | Status: COMPLETED | COMMUNITY
Start: 2022-10-26

## 2023-04-03 ENCOUNTER — RX RENEWAL (OUTPATIENT)
Age: 73
End: 2023-04-03

## 2023-05-08 ENCOUNTER — NON-APPOINTMENT (OUTPATIENT)
Age: 73
End: 2023-05-08

## 2023-06-27 ENCOUNTER — NON-APPOINTMENT (OUTPATIENT)
Age: 73
End: 2023-06-27

## 2023-08-16 ENCOUNTER — NON-APPOINTMENT (OUTPATIENT)
Age: 73
End: 2023-08-16

## 2023-09-13 ENCOUNTER — APPOINTMENT (OUTPATIENT)
Dept: UROLOGY | Facility: CLINIC | Age: 73
End: 2023-09-13

## 2023-11-19 ENCOUNTER — NON-APPOINTMENT (OUTPATIENT)
Age: 73
End: 2023-11-19

## 2023-11-29 ENCOUNTER — APPOINTMENT (OUTPATIENT)
Dept: OPHTHALMOLOGY | Facility: CLINIC | Age: 73
End: 2023-11-29

## 2024-01-31 ENCOUNTER — NON-APPOINTMENT (OUTPATIENT)
Age: 74
End: 2024-01-31

## 2024-05-06 RX ORDER — TADALAFIL 5 MG/1
5 TABLET ORAL
Qty: 90 | Refills: 1 | Status: ACTIVE | COMMUNITY
Start: 2023-03-03 | End: 1900-01-01

## 2024-05-29 ENCOUNTER — APPOINTMENT (OUTPATIENT)
Dept: INTERNAL MEDICINE | Facility: CLINIC | Age: 74
End: 2024-05-29
Payer: MEDICARE

## 2024-05-29 VITALS — WEIGHT: 190 LBS | HEIGHT: 70 IN | BODY MASS INDEX: 27.2 KG/M2

## 2024-05-29 VITALS — SYSTOLIC BLOOD PRESSURE: 122 MMHG | DIASTOLIC BLOOD PRESSURE: 74 MMHG

## 2024-05-29 DIAGNOSIS — M10.9 GOUT, UNSPECIFIED: ICD-10-CM

## 2024-05-29 DIAGNOSIS — M79.18 MYALGIA, OTHER SITE: ICD-10-CM

## 2024-05-29 DIAGNOSIS — I10 ESSENTIAL (PRIMARY) HYPERTENSION: ICD-10-CM

## 2024-05-29 DIAGNOSIS — I45.10 UNSPECIFIED RIGHT BUNDLE-BRANCH BLOCK: ICD-10-CM

## 2024-05-29 DIAGNOSIS — Z00.00 ENCOUNTER FOR GENERAL ADULT MEDICAL EXAMINATION W/OUT ABNORMAL FINDINGS: ICD-10-CM

## 2024-05-29 DIAGNOSIS — E78.5 HYPERLIPIDEMIA, UNSPECIFIED: ICD-10-CM

## 2024-05-29 DIAGNOSIS — C67.9 MALIGNANT NEOPLASM OF BLADDER, UNSPECIFIED: ICD-10-CM

## 2024-05-29 DIAGNOSIS — R79.89 OTHER SPECIFIED ABNORMAL FINDINGS OF BLOOD CHEMISTRY: ICD-10-CM

## 2024-05-29 DIAGNOSIS — L02.33 CARBUNCLE OF BUTTOCK: ICD-10-CM

## 2024-05-29 LAB
25(OH)D3 SERPL-MCNC: 52.2 NG/ML
ALBUMIN SERPL ELPH-MCNC: 4.4 G/DL
ALP BLD-CCNC: 90 U/L
ALT SERPL-CCNC: 21 U/L
ANION GAP SERPL CALC-SCNC: 11 MMOL/L
APPEARANCE: CLEAR
AST SERPL-CCNC: 15 U/L
BACTERIA: NEGATIVE /HPF
BASOPHILS # BLD AUTO: 0.04 K/UL
BASOPHILS NFR BLD AUTO: 0.6 %
BILIRUB SERPL-MCNC: 0.4 MG/DL
BILIRUBIN URINE: NEGATIVE
BLOOD URINE: NEGATIVE
BUN SERPL-MCNC: 31 MG/DL
CALCIUM SERPL-MCNC: 9.2 MG/DL
CAST: 1 /LPF
CHLORIDE SERPL-SCNC: 104 MMOL/L
CHOLEST SERPL-MCNC: 222 MG/DL
CK SERPL-CCNC: 67 U/L
CO2 SERPL-SCNC: 27 MMOL/L
COLOR: YELLOW
CREAT SERPL-MCNC: 1.29 MG/DL
EGFR: 59 ML/MIN/1.73M2
EOSINOPHIL # BLD AUTO: 0.07 K/UL
EOSINOPHIL NFR BLD AUTO: 1 %
EPITHELIAL CELLS: 0 /HPF
ESTIMATED AVERAGE GLUCOSE: 108 MG/DL
GLUCOSE QUALITATIVE U: NEGATIVE MG/DL
GLUCOSE SERPL-MCNC: 90 MG/DL
HBA1C MFR BLD HPLC: 5.4 %
HCT VFR BLD CALC: 50.3 %
HDLC SERPL-MCNC: 52 MG/DL
HGB BLD-MCNC: 16.7 G/DL
IMM GRANULOCYTES NFR BLD AUTO: 1.1 %
KETONES URINE: NEGATIVE MG/DL
LDLC SERPL CALC-MCNC: 146 MG/DL
LEUKOCYTE ESTERASE URINE: NEGATIVE
LYMPHOCYTES # BLD AUTO: 2.32 K/UL
LYMPHOCYTES NFR BLD AUTO: 33.1 %
MAN DIFF?: NORMAL
MCHC RBC-ENTMCNC: 30.4 PG
MCHC RBC-ENTMCNC: 33.2 GM/DL
MCV RBC AUTO: 91.5 FL
MICROSCOPIC-UA: NORMAL
MONOCYTES # BLD AUTO: 0.54 K/UL
MONOCYTES NFR BLD AUTO: 7.7 %
NEUTROPHILS # BLD AUTO: 3.95 K/UL
NEUTROPHILS NFR BLD AUTO: 56.5 %
NITRITE URINE: NEGATIVE
NONHDLC SERPL-MCNC: 170 MG/DL
PH URINE: 6
PLATELET # BLD AUTO: 249 K/UL
POTASSIUM SERPL-SCNC: 4.7 MMOL/L
PROT SERPL-MCNC: 6.6 G/DL
PROTEIN URINE: NORMAL MG/DL
PSA SERPL-MCNC: 4.77 NG/ML
RBC # BLD: 5.5 M/UL
RBC # FLD: 13.4 %
RED BLOOD CELLS URINE: 0 /HPF
SODIUM SERPL-SCNC: 141 MMOL/L
SPECIFIC GRAVITY URINE: 1.02
T4 FREE SERPL-MCNC: 1.3 NG/DL
TRIGL SERPL-MCNC: 138 MG/DL
TSH SERPL-ACNC: 2.83 UIU/ML
URATE SERPL-MCNC: 8.2 MG/DL
UROBILINOGEN URINE: 0.2 MG/DL
VIT B12 SERPL-MCNC: 358 PG/ML
WBC # FLD AUTO: 7 K/UL
WHITE BLOOD CELLS URINE: 0 /HPF

## 2024-05-29 PROCEDURE — 99213 OFFICE O/P EST LOW 20 MIN: CPT | Mod: 25

## 2024-05-29 PROCEDURE — G0439: CPT

## 2024-05-29 RX ORDER — AMOXICILLIN AND CLAVULANATE POTASSIUM 875; 125 MG/1; MG/1
875-125 TABLET, COATED ORAL
Qty: 10 | Refills: 1 | Status: ACTIVE | COMMUNITY
Start: 2024-05-29 | End: 1900-01-01

## 2024-05-29 RX ORDER — ROSUVASTATIN CALCIUM 5 MG/1
5 TABLET, FILM COATED ORAL
Refills: 0 | Status: COMPLETED | COMMUNITY
End: 2024-05-29

## 2024-05-29 RX ORDER — ROSUVASTATIN CALCIUM 10 MG/1
10 TABLET, FILM COATED ORAL
Qty: 90 | Refills: 3 | Status: COMPLETED | COMMUNITY
End: 2024-05-29

## 2024-05-29 RX ORDER — VALSARTAN 160 MG/1
160 TABLET, COATED ORAL DAILY
Qty: 90 | Refills: 3 | Status: ACTIVE | COMMUNITY
Start: 2019-03-15 | End: 1900-01-01

## 2024-05-29 RX ORDER — AMLODIPINE BESYLATE 5 MG/1
5 TABLET ORAL
Qty: 90 | Refills: 3 | Status: ACTIVE | COMMUNITY
Start: 2019-03-15 | End: 1900-01-01

## 2024-05-29 NOTE — HEALTH RISK ASSESSMENT
[No] : No [1 or 2 (0 pts)] : 1 or 2 (0 points) [Never (0 pts)] : Never (0 points) [No falls in past year] : Patient reported no falls in the past year [0] : 2) Feeling down, depressed, or hopeless: Not at all (0) [PHQ-2 Negative - No further assessment needed] : PHQ-2 Negative - No further assessment needed [Never] : Never [Patient reported colonoscopy was normal] : Patient reported colonoscopy was normal [Audit-CScore] : 1 [[id number]] : 0 [ColonoscopyDate] : 2021 [ColonoscopyComments] : elsewhere

## 2024-05-29 NOTE — PHYSICAL EXAM
[Well Nourished] : well nourished [Well Developed] : well developed [Well-Appearing] : well-appearing [Normal Sclera/Conjunctiva] : normal sclera/conjunctiva [Normal Outer Ear/Nose] : the outer ears and nose were normal in appearance [Normal Oropharynx] : the oropharynx was normal [No JVD] : no jugular venous distention [No Lymphadenopathy] : no lymphadenopathy [Supple] : supple [Thyroid Normal, No Nodules] : the thyroid was normal and there were no nodules present [No Respiratory Distress] : no respiratory distress  [No Accessory Muscle Use] : no accessory muscle use [Clear to Auscultation] : lungs were clear to auscultation bilaterally [Normal Rate] : normal rate  [Regular Rhythm] : with a regular rhythm [Normal S1, S2] : normal S1 and S2 [No Murmur] : no murmur heard [No Carotid Bruits] : no carotid bruits [No Abdominal Bruit] : a ~M bruit was not heard ~T in the abdomen [No Varicosities] : no varicosities [Pedal Pulses Present] : the pedal pulses are present [No Edema] : there was no peripheral edema [No Palpable Aorta] : no palpable aorta [No Extremity Clubbing/Cyanosis] : no extremity clubbing/cyanosis [Normal Appearance] : normal in appearance [Soft] : abdomen soft [Non Tender] : non-tender [Non-distended] : non-distended [No Masses] : no abdominal mass palpated [No HSM] : no HSM [Normal Bowel Sounds] : normal bowel sounds [Testes Mass (___cm)] : there were no testicular masses [Normal Posterior Cervical Nodes] : no posterior cervical lymphadenopathy [Normal Anterior Cervical Nodes] : no anterior cervical lymphadenopathy [No CVA Tenderness] : no CVA  tenderness [No Spinal Tenderness] : no spinal tenderness [No Joint Swelling] : no joint swelling [Grossly Normal Strength/Tone] : grossly normal strength/tone [No Rash] : no rash [Coordination Grossly Intact] : coordination grossly intact [No Focal Deficits] : no focal deficits [Normal Gait] : normal gait [Deep Tendon Reflexes (DTR)] : deep tendon reflexes were 2+ and symmetric [Normal Affect] : the affect was normal [Normal Insight/Judgement] : insight and judgment were intact [FreeTextEntry1] : 1 cm lesion right cheek some exudate [de-identified] : Buttock lesion as above

## 2024-05-29 NOTE — HISTORY OF PRESENT ILLNESS
[FreeTextEntry1] : Here to follow ongoing conditions and for complete yearly examination [de-identified] : Recent wart right buttock frozen by dermatologist a few weeks ago now painful  hypertension well controlled on medications BPH with some obstruction followed closely by urologist  Past bladder cancer gets yearly cystoscopy by urologist recently seen had prostate biopsy, less than 2 years ago, has low grade prostate cancer for active surveillance On time with colonoscopies with Dr. Tobar One episode of gout in past probably diet related with no recurrences since he has started following a gout diet Hyperlipidemia on statin in the past not using statin ejaculation issues from tamsulosin, on tadalafil 5 recent cardiology evaluation ok

## 2024-05-29 NOTE — ASSESSMENT
[FreeTextEntry1] : Right buttock lesion HTN well controlled Right bundle branch block followed by urologist past bladder cancer and obstructive BPH and low grade prostate cancer PSA completely stable On time with gastroenterologist for screening colonoscopies

## 2025-01-20 ENCOUNTER — NON-APPOINTMENT (OUTPATIENT)
Age: 75
End: 2025-01-20

## 2025-04-23 ENCOUNTER — RX RENEWAL (OUTPATIENT)
Age: 75
End: 2025-04-23

## 2025-05-22 ENCOUNTER — NON-APPOINTMENT (OUTPATIENT)
Age: 75
End: 2025-05-22

## 2025-06-04 DIAGNOSIS — E07.9 DISORDER OF THYROID, UNSPECIFIED: ICD-10-CM

## 2025-06-04 DIAGNOSIS — E53.8 DEFICIENCY OF OTHER SPECIFIED B GROUP VITAMINS: ICD-10-CM

## 2025-06-04 DIAGNOSIS — E55.9 VITAMIN D DEFICIENCY, UNSPECIFIED: ICD-10-CM

## 2025-06-05 LAB
25(OH)D3 SERPL-MCNC: 39.8 NG/ML
ALBUMIN SERPL ELPH-MCNC: 4.2 G/DL
ALP BLD-CCNC: 97 U/L
ALT SERPL-CCNC: 36 U/L
ANION GAP SERPL CALC-SCNC: 16 MMOL/L
APPEARANCE: CLEAR
AST SERPL-CCNC: 26 U/L
BASOPHILS # BLD AUTO: 0.04 K/UL
BASOPHILS NFR BLD AUTO: 0.7 %
BILIRUB SERPL-MCNC: 0.7 MG/DL
BILIRUBIN URINE: NEGATIVE
BLOOD URINE: NEGATIVE
BUN SERPL-MCNC: 26 MG/DL
CALCIUM SERPL-MCNC: 9.3 MG/DL
CHLORIDE SERPL-SCNC: 101 MMOL/L
CHOLEST SERPL-MCNC: 147 MG/DL
CK SERPL-CCNC: 131 U/L
CO2 SERPL-SCNC: 25 MMOL/L
COLOR: YELLOW
CREAT SERPL-MCNC: 1.42 MG/DL
EGFRCR SERPLBLD CKD-EPI 2021: 52 ML/MIN/1.73M2
EOSINOPHIL # BLD AUTO: 0.06 K/UL
EOSINOPHIL NFR BLD AUTO: 1.1 %
ESTIMATED AVERAGE GLUCOSE: 114 MG/DL
GLUCOSE QUALITATIVE U: NEGATIVE MG/DL
GLUCOSE SERPL-MCNC: 93 MG/DL
HBA1C MFR BLD HPLC: 5.6 %
HCT VFR BLD CALC: 47.1 %
HDLC SERPL-MCNC: 44 MG/DL
HGB BLD-MCNC: 15.2 G/DL
IMM GRANULOCYTES NFR BLD AUTO: 0.2 %
KETONES URINE: NEGATIVE MG/DL
LDLC SERPL-MCNC: 86 MG/DL
LEUKOCYTE ESTERASE URINE: NEGATIVE
LYMPHOCYTES # BLD AUTO: 1.9 K/UL
LYMPHOCYTES NFR BLD AUTO: 33.8 %
MAN DIFF?: NORMAL
MCHC RBC-ENTMCNC: 29.7 PG
MCHC RBC-ENTMCNC: 32.3 G/DL
MCV RBC AUTO: 92 FL
MONOCYTES # BLD AUTO: 0.46 K/UL
MONOCYTES NFR BLD AUTO: 8.2 %
NEUTROPHILS # BLD AUTO: 3.15 K/UL
NEUTROPHILS NFR BLD AUTO: 56 %
NITRITE URINE: NEGATIVE
NONHDLC SERPL-MCNC: 104 MG/DL
PH URINE: 5.5
PLATELET # BLD AUTO: 195 K/UL
POTASSIUM SERPL-SCNC: 4.7 MMOL/L
PROT SERPL-MCNC: 6.7 G/DL
PROTEIN URINE: NORMAL MG/DL
PSA SERPL-MCNC: 5.44 NG/ML
RBC # BLD: 5.12 M/UL
RBC # FLD: 12.8 %
SODIUM SERPL-SCNC: 141 MMOL/L
SPECIFIC GRAVITY URINE: 1.02
T4 FREE SERPL-MCNC: 1.2 NG/DL
TRIGL SERPL-MCNC: 95 MG/DL
TSH SERPL-ACNC: 2.66 UIU/ML
URATE SERPL-MCNC: 9.1 MG/DL
UROBILINOGEN URINE: 0.2 MG/DL
VIT B12 SERPL-MCNC: 405 PG/ML
WBC # FLD AUTO: 5.62 K/UL

## 2025-06-16 ENCOUNTER — NON-APPOINTMENT (OUTPATIENT)
Age: 75
End: 2025-06-16

## 2025-06-16 ENCOUNTER — APPOINTMENT (OUTPATIENT)
Dept: INTERNAL MEDICINE | Facility: CLINIC | Age: 75
End: 2025-06-16
Payer: COMMERCIAL

## 2025-06-16 VITALS
HEART RATE: 64 BPM | DIASTOLIC BLOOD PRESSURE: 72 MMHG | SYSTOLIC BLOOD PRESSURE: 136 MMHG | OXYGEN SATURATION: 98 % | HEIGHT: 70 IN | BODY MASS INDEX: 28.49 KG/M2 | WEIGHT: 199 LBS

## 2025-06-16 PROCEDURE — 93000 ELECTROCARDIOGRAM COMPLETE: CPT

## 2025-06-16 PROCEDURE — 36415 COLL VENOUS BLD VENIPUNCTURE: CPT

## 2025-06-16 PROCEDURE — 99213 OFFICE O/P EST LOW 20 MIN: CPT | Mod: 25

## 2025-06-16 PROCEDURE — G0439: CPT

## 2025-06-16 PROCEDURE — G2211 COMPLEX E/M VISIT ADD ON: CPT

## 2025-06-17 LAB
ALBUMIN SERPL ELPH-MCNC: 4.7 G/DL
ALP BLD-CCNC: 111 U/L
ALT SERPL-CCNC: 37 U/L
ANION GAP SERPL CALC-SCNC: 13 MMOL/L
AST SERPL-CCNC: 27 U/L
BILIRUB SERPL-MCNC: 0.6 MG/DL
BUN SERPL-MCNC: 20 MG/DL
CALCIUM SERPL-MCNC: 9.4 MG/DL
CHLORIDE SERPL-SCNC: 103 MMOL/L
CO2 SERPL-SCNC: 26 MMOL/L
CREAT SERPL-MCNC: 1.28 MG/DL
EGFRCR SERPLBLD CKD-EPI 2021: 59 ML/MIN/1.73M2
GLUCOSE SERPL-MCNC: 89 MG/DL
POTASSIUM SERPL-SCNC: 4.5 MMOL/L
PROT SERPL-MCNC: 7.2 G/DL
SODIUM SERPL-SCNC: 142 MMOL/L

## 2025-06-18 LAB
ANION GAP SERPL CALC-SCNC: 16 MMOL/L
BUN SERPL-MCNC: 20 MG/DL
CALCIUM SERPL-MCNC: 9.4 MG/DL
CHLORIDE SERPL-SCNC: 103 MMOL/L
CO2 SERPL-SCNC: 23 MMOL/L
CREAT SERPL-MCNC: 1.26 MG/DL
EGFRCR SERPLBLD CKD-EPI 2021: 61 ML/MIN/1.73M2
GLUCOSE SERPL-MCNC: 91 MG/DL
POTASSIUM SERPL-SCNC: 4.5 MMOL/L
SODIUM SERPL-SCNC: 142 MMOL/L

## 2025-07-01 RX ORDER — ROSUVASTATIN CALCIUM 5 MG/1
5 TABLET, FILM COATED ORAL
Qty: 90 | Refills: 3 | Status: ACTIVE | COMMUNITY
Start: 2025-07-01 | End: 1900-01-01